# Patient Record
Sex: MALE | Race: WHITE | NOT HISPANIC OR LATINO | Employment: OTHER | URBAN - METROPOLITAN AREA
[De-identification: names, ages, dates, MRNs, and addresses within clinical notes are randomized per-mention and may not be internally consistent; named-entity substitution may affect disease eponyms.]

---

## 2017-08-22 ENCOUNTER — NEW PATIENT (OUTPATIENT)
Dept: URBAN - METROPOLITAN AREA CLINIC 39 | Facility: CLINIC | Age: 77
End: 2017-08-22

## 2017-08-22 DIAGNOSIS — H25.811: ICD-10-CM

## 2017-08-22 DIAGNOSIS — H04.123: ICD-10-CM

## 2017-08-22 DIAGNOSIS — H25.812: ICD-10-CM

## 2017-08-22 PROCEDURE — 92004 COMPRE OPH EXAM NEW PT 1/>: CPT

## 2017-08-22 PROCEDURE — 1036F TOBACCO NON-USER: CPT

## 2017-08-22 PROCEDURE — 92136TC INTERFEROMETRY - TECHNICAL COMPONENT

## 2017-08-22 PROCEDURE — G8428 CUR MEDS NOT DOCUMENT: HCPCS

## 2017-08-22 PROCEDURE — 92025-2 CORNEAL TOPOGRAPHY, PT

## 2017-08-22 ASSESSMENT — VISUAL ACUITY
OS_CC: J6
OS_GLARE: 20/400
OD_SC: CF 5FT
OD_GLARE: 20/200
OD_CC: 20/40
OS_CC: 20/40
OS_SC: 20/400
OD_CC: J8

## 2017-08-22 ASSESSMENT — TONOMETRY
OD_IOP_MMHG: 17
OS_IOP_MMHG: 18

## 2017-08-24 ENCOUNTER — SURGERY/PROCEDURE (OUTPATIENT)
Dept: URBAN - METROPOLITAN AREA CLINIC 39 | Facility: CLINIC | Age: 77
End: 2017-08-24

## 2017-08-24 DIAGNOSIS — H25.811: ICD-10-CM

## 2017-08-24 PROCEDURE — 66999LNSR LENSAR LASER FOR CAT SX

## 2017-08-24 PROCEDURE — 66984AV REMOVE CATARACT, INSERT ADVANCED LENS

## 2017-08-25 ENCOUNTER — CATARACT POST-OP 1-DAY (OUTPATIENT)
Dept: URBAN - METROPOLITAN AREA CLINIC 39 | Facility: CLINIC | Age: 77
End: 2017-08-25

## 2017-08-25 DIAGNOSIS — Z96.1: ICD-10-CM

## 2017-08-25 PROCEDURE — 99024 POSTOP FOLLOW-UP VISIT: CPT

## 2017-08-25 ASSESSMENT — TONOMETRY
OS_IOP_MMHG: 17
OD_IOP_MMHG: 18

## 2017-08-25 ASSESSMENT — VISUAL ACUITY
OD_SC: J2
OD_SC: 20/25
OS_SC: J10
OS_SC: 20/400

## 2017-08-28 ENCOUNTER — POST-OP (OUTPATIENT)
Dept: URBAN - METROPOLITAN AREA CLINIC 39 | Facility: CLINIC | Age: 77
End: 2017-08-28

## 2017-08-28 DIAGNOSIS — Z96.1: ICD-10-CM

## 2017-08-28 DIAGNOSIS — H25.812: ICD-10-CM

## 2017-08-28 PROCEDURE — G8756 NO BP MEASURE DOC: HCPCS

## 2017-08-28 PROCEDURE — 99024 POSTOP FOLLOW-UP VISIT: CPT

## 2017-08-28 PROCEDURE — 92012 INTRM OPH EXAM EST PATIENT: CPT

## 2017-08-28 PROCEDURE — 1036F TOBACCO NON-USER: CPT

## 2017-08-28 PROCEDURE — G8427 DOCREV CUR MEDS BY ELIG CLIN: HCPCS

## 2017-08-28 ASSESSMENT — KERATOMETRY
OD_K1POWER_DIOPTERS: 41.00
OD_AXISANGLE_DEGREES: 42.00

## 2017-08-28 ASSESSMENT — TONOMETRY
OS_IOP_MMHG: 18
OD_IOP_MMHG: 17

## 2017-08-28 ASSESSMENT — VISUAL ACUITY
OD_SC: J3
OD_SC: 20/25

## 2017-08-31 ENCOUNTER — SURGERY/PROCEDURE (OUTPATIENT)
Dept: URBAN - METROPOLITAN AREA CLINIC 39 | Facility: CLINIC | Age: 77
End: 2017-08-31

## 2017-08-31 DIAGNOSIS — H25.812: ICD-10-CM

## 2017-08-31 PROCEDURE — 65772LRI LRI DURING CAT SX

## 2017-08-31 PROCEDURE — 66984AV REMOVE CATARACT, INSERT ADVANCED LENS

## 2017-08-31 PROCEDURE — 66999LNSR LENSAR LASER FOR CAT SX

## 2017-08-31 ASSESSMENT — KERATOMETRY
OD_K1POWER_DIOPTERS: 41.00
OD_AXISANGLE_DEGREES: 42.00

## 2017-09-01 ENCOUNTER — CATARACT POST-OP 1-DAY (OUTPATIENT)
Dept: URBAN - METROPOLITAN AREA CLINIC 39 | Facility: CLINIC | Age: 77
End: 2017-09-01

## 2017-09-01 DIAGNOSIS — Z96.1: ICD-10-CM

## 2017-09-01 PROCEDURE — 99024 POSTOP FOLLOW-UP VISIT: CPT

## 2017-09-01 ASSESSMENT — KERATOMETRY
OD_K1POWER_DIOPTERS: 41.00
OD_AXISANGLE_DEGREES: 42.00

## 2017-09-01 ASSESSMENT — VISUAL ACUITY
OS_PH: 20/40
OS_SC: 20/60
OS_SC: J2

## 2017-09-01 ASSESSMENT — TONOMETRY: OS_IOP_MMHG: 18

## 2017-09-25 ENCOUNTER — POST-OP (OUTPATIENT)
Dept: URBAN - METROPOLITAN AREA CLINIC 39 | Facility: CLINIC | Age: 77
End: 2017-09-25

## 2017-09-25 DIAGNOSIS — Z96.1: ICD-10-CM

## 2017-09-25 PROCEDURE — 99024 POSTOP FOLLOW-UP VISIT: CPT

## 2017-09-25 ASSESSMENT — VISUAL ACUITY
OS_SC: 20/60
OS_SC: J1+
OD_SC: J2-1
OD_SC: 20/25+1

## 2017-09-25 ASSESSMENT — KERATOMETRY
OD_K1POWER_DIOPTERS: 41.50
OS_AXISANGLE_DEGREES: 42.75
OD_AXISANGLE_DEGREES: 42.25
OS_K1POWER_DIOPTERS: 41.75

## 2017-09-25 ASSESSMENT — TONOMETRY
OS_IOP_MMHG: 15
OD_IOP_MMHG: 15

## 2018-01-11 NOTE — PATIENT DISCUSSION
AMBLYOPIA SUSPECT, OS- EXPLAINED LIMITED VISUAL POTENTIAL. PT TO TRY TO GET OLD RECORDS TO CONFIRM. FOLLOW.

## 2018-01-11 NOTE — PATIENT DISCUSSION
CATARACTS, OU-  CLINICALLY SIGNIFICANT. PT TO CONSIDER SURGERY WHEN MORE VISUALLY BOTHERSOME. ADVISED PT THAT SHE MAY NOT TOLERATE GLASSES DUE TO THE LARGE DIFFERENCE IN POWER BETWEEN THE 2 EYES. FOLLOW.

## 2018-01-11 NOTE — PATIENT DISCUSSION
Considering  Surgery Counseling: I have discussed the option of glasses versus cataract surgery versus following . It was explained that when vision no longer meets the patient's visual needs and a new prescription for glasses is not likely to satisfy the patient, the option of cataract surgery is a reasonable next step. It was explained that there is no guarantee that removing the cataract will improve their visual symptoms, however; it is believed that the cataract is contributing to the patient's visual impairment and surgery may significantly improve both the visual and functional status of the patient. The risks, benefits and alternatives of surgery were discussed with the patient. After this discussion, the patient desires to consider options.

## 2018-05-31 ENCOUNTER — ESTABLISHED COMPREHENSIVE EXAM (OUTPATIENT)
Dept: URBAN - METROPOLITAN AREA CLINIC 39 | Facility: CLINIC | Age: 78
End: 2018-05-31

## 2018-05-31 DIAGNOSIS — H43.812: ICD-10-CM

## 2018-05-31 DIAGNOSIS — H04.123: ICD-10-CM

## 2018-05-31 DIAGNOSIS — H26.493: ICD-10-CM

## 2018-05-31 PROCEDURE — 92014 COMPRE OPH EXAM EST PT 1/>: CPT

## 2018-05-31 PROCEDURE — G8427 DOCREV CUR MEDS BY ELIG CLIN: HCPCS

## 2018-05-31 PROCEDURE — 92015 DETERMINE REFRACTIVE STATE: CPT

## 2018-05-31 PROCEDURE — 1036F TOBACCO NON-USER: CPT

## 2018-05-31 ASSESSMENT — TONOMETRY
OS_IOP_MMHG: 14
OD_IOP_MMHG: 11

## 2018-05-31 ASSESSMENT — VISUAL ACUITY
OS_SC: 20/30-2
OU_SC: 20/25
OD_SC: 20/25-1
OU_SC: J2
OS_SC: J2
OD_SC: J3

## 2018-05-31 ASSESSMENT — KERATOMETRY
OD_AXISANGLE_DEGREES: 42.25
OD_K1POWER_DIOPTERS: 41.50
OS_AXISANGLE_DEGREES: 42.75
OS_K1POWER_DIOPTERS: 41.75

## 2019-05-30 ASSESSMENT — KERATOMETRY
OD_K1POWER_DIOPTERS: 41.50
OS_K1POWER_DIOPTERS: 41.75
OD_AXISANGLE_DEGREES: 42.25
OS_AXISANGLE_DEGREES: 42.75

## 2019-05-31 ENCOUNTER — ESTABLISHED COMPREHENSIVE EXAM (OUTPATIENT)
Dept: URBAN - METROPOLITAN AREA CLINIC 39 | Facility: CLINIC | Age: 79
End: 2019-05-31

## 2019-05-31 DIAGNOSIS — H43.812: ICD-10-CM

## 2019-05-31 DIAGNOSIS — Z96.1: ICD-10-CM

## 2019-05-31 DIAGNOSIS — H26.493: ICD-10-CM

## 2019-05-31 DIAGNOSIS — H04.123: ICD-10-CM

## 2019-05-31 PROCEDURE — 92015 DETERMINE REFRACTIVE STATE: CPT

## 2019-05-31 PROCEDURE — 92014 COMPRE OPH EXAM EST PT 1/>: CPT

## 2019-05-31 ASSESSMENT — VISUAL ACUITY
OD_SC: J2
OS_SC: J2
OD_SC: 20/25
OS_SC: 20/30+2

## 2019-05-31 ASSESSMENT — TONOMETRY
OS_IOP_MMHG: 14
OD_IOP_MMHG: 12

## 2020-06-01 ENCOUNTER — ESTABLISHED COMPREHENSIVE EXAM (OUTPATIENT)
Dept: URBAN - METROPOLITAN AREA CLINIC 40 | Facility: CLINIC | Age: 80
End: 2020-06-01

## 2020-06-01 DIAGNOSIS — H52.203: ICD-10-CM

## 2020-06-01 DIAGNOSIS — H11.153: ICD-10-CM

## 2020-06-01 DIAGNOSIS — H04.123: ICD-10-CM

## 2020-06-01 DIAGNOSIS — H43.812: ICD-10-CM

## 2020-06-01 DIAGNOSIS — H26.493: ICD-10-CM

## 2020-06-01 PROCEDURE — 92499OP2 OPTOMAP RETINAL SCREENING BOTH EYES

## 2020-06-01 PROCEDURE — 92014 COMPRE OPH EXAM EST PT 1/>: CPT

## 2020-06-01 PROCEDURE — 92015 DETERMINE REFRACTIVE STATE: CPT

## 2020-06-01 ASSESSMENT — TONOMETRY
OS_IOP_MMHG: 15
OD_IOP_MMHG: 16

## 2020-06-01 ASSESSMENT — VISUAL ACUITY
OS_SC: 20/25
OD_BAT: 20/50
OD_SC: J2
OS_BAT: 20/50
OD_SC: 20/30-2
OS_SC: J3
OU_SC: 20/20
OU_SC: J2

## 2020-06-01 ASSESSMENT — KERATOMETRY
OS_AXISANGLE_DEGREES: 42.75
OD_AXISANGLE_DEGREES: 42.25
OD_K1POWER_DIOPTERS: 41.50
OS_K1POWER_DIOPTERS: 41.75

## 2021-06-02 ENCOUNTER — ESTABLISHED COMPREHENSIVE EXAM (OUTPATIENT)
Dept: URBAN - METROPOLITAN AREA CLINIC 39 | Facility: CLINIC | Age: 81
End: 2021-06-02

## 2021-06-02 DIAGNOSIS — H52.203: ICD-10-CM

## 2021-06-02 DIAGNOSIS — H11.153: ICD-10-CM

## 2021-06-02 DIAGNOSIS — H04.123: ICD-10-CM

## 2021-06-02 PROCEDURE — 92499OP2 OPTOMAP RETINAL SCREENING BOTH EYES

## 2021-06-02 PROCEDURE — 92014 COMPRE OPH EXAM EST PT 1/>: CPT

## 2021-06-02 PROCEDURE — 92015 DETERMINE REFRACTIVE STATE: CPT

## 2021-06-02 ASSESSMENT — KERATOMETRY
OD_AXISANGLE_DEGREES: 42.25
OD_K1POWER_DIOPTERS: 41.50
OS_K1POWER_DIOPTERS: 41.75
OS_AXISANGLE_DEGREES: 42.75

## 2021-06-02 ASSESSMENT — VISUAL ACUITY
OU_SC: 20/25
OS_SC: 20/25-2
OS_BAT: 20/60
OD_SC: J3
OU_SC: J1
OD_SC: 20/30
OS_SC: J2
OD_BAT: 20/70

## 2021-06-02 ASSESSMENT — TONOMETRY
OD_IOP_MMHG: 16
OS_IOP_MMHG: 16

## 2021-06-08 NOTE — PATIENT DISCUSSION
REFRACTIVE ERROR AND ANISOMETROPIA - PT WOULD LIKE TO TRIAL A CONTACT LENS FOR DISTANCE IN THE LEFT EYE AND TRY TO READ WITH HER RIGHT EYE UNCORRECTED. CL TRIAL DISPENSED OS TODAY - RTC WEARING TRIAL FOR CLF/FINAL CL RX IF PATIENT LIKES VISION. OTHERWISE CONTINUE WITH GLS.

## 2021-06-08 NOTE — PATIENT DISCUSSION
Patient calling returning the prior call.    Please give patient a call back.     [employer] - Daily wearOD-[phone]&nbsp;SN &nbsp; &nbsp; sharla

## 2021-06-08 NOTE — PATIENT DISCUSSION
CATARACTS, OU - VISUALLY SIGNIFICANT ALTHOUGH PATIENT NOT BOTHERED. DISC OPTION OF FOI-IL-CHWSEN. GLASSES RX GIVEN TO FILL IF DESIRES. RE-EVALUATE IN 1 YEAR, SOONER IF VISION WORSENS.

## 2021-06-08 NOTE — PATIENT DISCUSSION
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 PATIENT REPORTS OMD PREVIOUSLY MONITORED ""SOMETHING IN HER RETINA"" BUT NO TREATMENTS DONE.  MONITOR X 1 YEAR

## 2021-06-23 ENCOUNTER — ESTABLISHED PATIENT (OUTPATIENT)
Dept: URBAN - METROPOLITAN AREA CLINIC 39 | Facility: CLINIC | Age: 81
End: 2021-06-23

## 2021-06-23 ENCOUNTER — SURGERY/PROCEDURE (OUTPATIENT)
Dept: URBAN - METROPOLITAN AREA SURGERY 14 | Facility: SURGERY | Age: 81
End: 2021-06-23

## 2021-06-23 DIAGNOSIS — H26.493: ICD-10-CM

## 2021-06-23 PROCEDURE — 92014 COMPRE OPH EXAM EST PT 1/>: CPT

## 2021-06-23 PROCEDURE — 6682150 YAG CAPSULOTOMY

## 2021-06-23 ASSESSMENT — VISUAL ACUITY
OD_SC: 20/25
OD_BAT: 20/50
OS_BAT: 20/50
OS_SC: 20/30-1

## 2021-06-23 ASSESSMENT — TONOMETRY
OD_IOP_MMHG: 9
OS_IOP_MMHG: 14

## 2021-06-25 ENCOUNTER — YAG POST-OP (OUTPATIENT)
Dept: URBAN - METROPOLITAN AREA CLINIC 39 | Facility: CLINIC | Age: 81
End: 2021-06-25

## 2021-06-25 DIAGNOSIS — Z98.890: ICD-10-CM

## 2021-06-25 PROCEDURE — 99024 POSTOP FOLLOW-UP VISIT: CPT

## 2021-06-25 ASSESSMENT — TONOMETRY
OD_IOP_MMHG: 17
OS_IOP_MMHG: 16

## 2021-06-25 ASSESSMENT — VISUAL ACUITY
OD_SC: J2
OS_SC: 20/25
OD_SC: 20/25+1
OS_SC: J1

## 2022-12-05 NOTE — PATIENT DISCUSSION
"Of note patient reports OMD previously monitored ""something in her retina"" but no treatments done. Patient also notes her two brothers are being monitored/treated for something in their retinas that is causing vision loss. "

## 2022-12-05 NOTE — PATIENT DISCUSSION
Educated patient on findings. Stable without mac edema on examination today. Will consider retinal referral in the future. Advised to call/RTC with any changes in vision. Monitor.

## 2023-10-17 ENCOUNTER — COMPREHENSIVE EXAM (OUTPATIENT)
Dept: URBAN - METROPOLITAN AREA CLINIC 39 | Facility: CLINIC | Age: 83
End: 2023-10-17

## 2023-10-17 DIAGNOSIS — H52.223: ICD-10-CM

## 2023-10-17 DIAGNOSIS — H04.123: ICD-10-CM

## 2023-10-17 DIAGNOSIS — H43.813: ICD-10-CM

## 2023-10-17 DIAGNOSIS — H11.153: ICD-10-CM

## 2023-10-17 PROCEDURE — 92014 COMPRE OPH EXAM EST PT 1/>: CPT

## 2023-10-17 PROCEDURE — 92015 DETERMINE REFRACTIVE STATE: CPT

## 2023-10-17 ASSESSMENT — TONOMETRY
OD_IOP_MMHG: 16
OS_IOP_MMHG: 16

## 2023-10-17 ASSESSMENT — VISUAL ACUITY
OS_SC: 20/25-1
OD_SC: 20/40+2
OD_SC: J2
OS_SC: J3
OU_SC: J2

## 2024-11-06 ENCOUNTER — COMPREHENSIVE EXAM (OUTPATIENT)
Dept: URBAN - METROPOLITAN AREA CLINIC 39 | Facility: CLINIC | Age: 84
End: 2024-11-06

## 2024-11-06 DIAGNOSIS — H11.153: ICD-10-CM

## 2024-11-06 DIAGNOSIS — H04.123: ICD-10-CM

## 2024-11-06 DIAGNOSIS — H43.813: ICD-10-CM

## 2024-11-06 DIAGNOSIS — H52.223: ICD-10-CM

## 2024-11-06 PROCEDURE — 92015 DETERMINE REFRACTIVE STATE: CPT

## 2024-11-06 PROCEDURE — 92014 COMPRE OPH EXAM EST PT 1/>: CPT
